# Patient Record
Sex: FEMALE | Race: WHITE | Employment: PART TIME | URBAN - METROPOLITAN AREA
[De-identification: names, ages, dates, MRNs, and addresses within clinical notes are randomized per-mention and may not be internally consistent; named-entity substitution may affect disease eponyms.]

---

## 2022-02-24 ENCOUNTER — HOSPITAL ENCOUNTER (EMERGENCY)
Age: 27
Discharge: HOME | End: 2022-02-24
Attending: EMERGENCY MEDICINE
Payer: COMMERCIAL

## 2022-02-24 VITALS
HEIGHT: 67 IN | DIASTOLIC BLOOD PRESSURE: 73 MMHG | HEART RATE: 70 BPM | RESPIRATION RATE: 16 BRPM | SYSTOLIC BLOOD PRESSURE: 108 MMHG | TEMPERATURE: 97.6 F | WEIGHT: 185 LBS | BODY MASS INDEX: 29.03 KG/M2 | OXYGEN SATURATION: 95 %

## 2022-02-24 DIAGNOSIS — R11.2 NON-INTRACTABLE VOMITING WITH NAUSEA, UNSPECIFIED VOMITING TYPE: ICD-10-CM

## 2022-02-24 DIAGNOSIS — R19.7 DIARRHEA, UNSPECIFIED TYPE: ICD-10-CM

## 2022-02-24 DIAGNOSIS — R55 POSTURAL DIZZINESS WITH PRESYNCOPE: Primary | ICD-10-CM

## 2022-02-24 DIAGNOSIS — R42 POSTURAL DIZZINESS WITH PRESYNCOPE: Primary | ICD-10-CM

## 2022-02-24 LAB
ALBUMIN SERPL-MCNC: 3.2 G/DL (ref 3.5–5)
ALBUMIN/GLOB SERPL: 0.8 {RATIO} (ref 1.1–2.2)
ALP SERPL-CCNC: 91 U/L (ref 45–117)
ALT SERPL-CCNC: 25 U/L (ref 12–78)
ANION GAP SERPL CALC-SCNC: 8 MMOL/L (ref 5–15)
AST SERPL-CCNC: 14 U/L (ref 15–37)
BASOPHILS # BLD: 0 K/UL (ref 0–0.1)
BASOPHILS NFR BLD: 0 % (ref 0–1)
BILIRUB SERPL-MCNC: 0.4 MG/DL (ref 0.2–1)
BUN SERPL-MCNC: 18 MG/DL (ref 6–20)
BUN/CREAT SERPL: 20 (ref 12–20)
CALCIUM SERPL-MCNC: 8.7 MG/DL (ref 8.5–10.1)
CHLORIDE SERPL-SCNC: 104 MMOL/L (ref 97–108)
CO2 SERPL-SCNC: 28 MMOL/L (ref 21–32)
CREAT SERPL-MCNC: 0.9 MG/DL (ref 0.55–1.02)
DIFFERENTIAL METHOD BLD: ABNORMAL
EOSINOPHIL # BLD: 0.1 K/UL (ref 0–0.4)
EOSINOPHIL NFR BLD: 1 % (ref 0–7)
ERYTHROCYTE [DISTWIDTH] IN BLOOD BY AUTOMATED COUNT: 12.8 % (ref 11.5–14.5)
GLOBULIN SER CALC-MCNC: 4.1 G/DL (ref 2–4)
GLUCOSE SERPL-MCNC: 86 MG/DL (ref 65–100)
HCG UR QL: NEGATIVE
HCT VFR BLD AUTO: 46.7 % (ref 35–47)
HGB BLD-MCNC: 15.3 G/DL (ref 11.5–16)
IMM GRANULOCYTES # BLD AUTO: 0.1 K/UL (ref 0–0.04)
IMM GRANULOCYTES NFR BLD AUTO: 1 % (ref 0–0.5)
LYMPHOCYTES # BLD: 0.6 K/UL (ref 0.8–3.5)
LYMPHOCYTES NFR BLD: 8 % (ref 12–49)
MCH RBC QN AUTO: 29.9 PG (ref 26–34)
MCHC RBC AUTO-ENTMCNC: 32.8 G/DL (ref 30–36.5)
MCV RBC AUTO: 91.2 FL (ref 80–99)
MONOCYTES # BLD: 0.5 K/UL (ref 0–1)
MONOCYTES NFR BLD: 6 % (ref 5–13)
NEUTS SEG # BLD: 6.6 K/UL (ref 1.8–8)
NEUTS SEG NFR BLD: 84 % (ref 32–75)
NRBC # BLD: 0 K/UL (ref 0–0.01)
NRBC BLD-RTO: 0 PER 100 WBC
PLATELET # BLD AUTO: 246 K/UL (ref 150–400)
PMV BLD AUTO: 12 FL (ref 8.9–12.9)
POTASSIUM SERPL-SCNC: 4.3 MMOL/L (ref 3.5–5.1)
PROT SERPL-MCNC: 7.3 G/DL (ref 6.4–8.2)
RBC # BLD AUTO: 5.12 M/UL (ref 3.8–5.2)
RBC MORPH BLD: ABNORMAL
SODIUM SERPL-SCNC: 140 MMOL/L (ref 136–145)
WBC # BLD AUTO: 7.9 K/UL (ref 3.6–11)

## 2022-02-24 PROCEDURE — 36415 COLL VENOUS BLD VENIPUNCTURE: CPT

## 2022-02-24 PROCEDURE — 80053 COMPREHEN METABOLIC PANEL: CPT

## 2022-02-24 PROCEDURE — 99283 EMERGENCY DEPT VISIT LOW MDM: CPT

## 2022-02-24 PROCEDURE — 81025 URINE PREGNANCY TEST: CPT

## 2022-02-24 PROCEDURE — 85025 COMPLETE CBC W/AUTO DIFF WBC: CPT

## 2022-02-24 NOTE — ED TRIAGE NOTES
Pt c/o nausea, vomiting and diarrhea x 1 day; went to [facility] this am, 0845 and 0915 near syncope  became sweaty and they [id number] and BP was 107/72 and 90/60; pt given 8mg zofran  Vomited x 1 after zofran; pt denied abdominal pain; pt reports 10-15x/24 hours and diarrhea 4-5x/24 hours; denied urinary SX.  Mucous membranes moist;

## 2022-02-24 NOTE — DISCHARGE INSTRUCTIONS
Thank you for allowing us to provide you with medical care today. We realize that you have many choices for your emergency care needs. We thank you for choosing [facility]. Please choose us in the future for any continued health care needs. We hope we addressed all of your medical concerns. We strive to provide excellent quality care in the Emergency Department. Anything less than excellent does not meet our expectations. The exam and treatment you received in the Emergency Department were for an emergent problem and are not intended as complete care. It is important that you follow up with a doctor, nurse practitioner, or physician's assistant for ongoing care. If your symptoms worsen or you do not improve as expected and you are unable to reach your usual health care provider, you should return to the Emergency Department. We are available 24 hours a day. Take this sheet with you when you go to your follow-up visit. If you have any problem arranging the follow-up visit, contact the Emergency Department immediately. Make an appointment your family doctor for follow up of this visit. Return to the ER if you are unable to be seen in a timely manner.

## 2022-02-24 NOTE — ED PROVIDER NOTES
68-year-old female without any significant medical history presents to the emergency department chief complaint of nausea, vomiting, diarrhea. Symptoms began yesterday morning and are improving. Symptoms began shortly after eating some [employer]. No fever. She went to her primary care doctor and had episode of presyncope was sent to urgent care. She is found to have a blood pressure in the 90s at urgent care and was sent to the emergency department for further management. No sick contacts. She is beginning to feel better after trying to take some Zofran. The history is provided by the patient and medical records. Nausea   This is a new problem. The current episode started yesterday. The problem has been gradually improving. There has been no fever. Associated symptoms include diarrhea. Pertinent negatives include no fever, no abdominal pain, no headaches, no arthralgias, no myalgias, no cough and no headaches. Risk factors include suspect food intake. Her pertinent negatives include no recent abdominal surgery. Vomiting   Associated symptoms include diarrhea. Pertinent negatives include no fever, no abdominal pain, no headaches, no arthralgias, no myalgias, no cough and no headaches. Her pertinent negatives include no recent abdominal surgery. Diarrhea   Associated symptoms include diarrhea, nausea and vomiting. Pertinent negatives include no fever, no dysuria, no headaches, no arthralgias, no myalgias and no chest pain. No past medical history on file. No past surgical history on file. No family history on file.     Social History     Socioeconomic History    Marital status: Not on file     Spouse name: Not on file    Number of children: Not on file    Years of education: Not on file    Highest education level: Not on file   Occupational History    Not on file   Tobacco Use    Smoking status: Not on file    Smokeless tobacco: Not on file   Substance and Sexual Activity    Alcohol use: Not on file    Drug use: Not on file    Sexual activity: Not on file   Other Topics Concern    Not on file   Social History Narrative    Not on file     Social Determinants of Health     Financial Resource Strain:     Difficulty of Paying Living Expenses: Not on file   Food Insecurity:     Worried About Running Out of Food in the Last Year: Not on file    [place] of Food in the Last Year: Not on file   Transportation Needs:     Lack of Transportation (Medical): Not on file    Lack of Transportation (Non-Medical): Not on file   Physical Activity:     Days of Exercise per Week: Not on file    Minutes of Exercise per Session: Not on file   Stress:     Feeling of Stress : Not on file   Social Connections:     Frequency of Communication with Friends and Family: Not on file    Frequency of Social Gatherings with Friends and Family: Not on file    Attends Scientologist Services: Not on file    Active Member of [address] [place] [address] or Organizations: Not on file    Attends Club or Organization Meetings: Not on file    Marital Status: Not on file   Intimate Partner Violence:     Fear of Current or Ex-Partner: Not on file    Emotionally Abused: Not on file    Physically Abused: Not on file    Sexually Abused: Not on file   Housing Stability:     Unable to Pay for Housing in the Last Year: Not on file    Number of [place] in the Last Year: Not on file    Unstable Housing in the Last Year: Not on file         ALLERGIES: Patient has no known allergies. Review of Systems   Constitutional: Negative for fatigue and fever. HENT: Negative for sneezing and sore throat. Respiratory: Negative for cough and shortness of breath. Cardiovascular: Negative for chest pain and leg swelling. Gastrointestinal: Positive for diarrhea, nausea and vomiting. Negative for abdominal pain. Genitourinary: Negative for difficulty urinating and dysuria. Musculoskeletal: Negative for arthralgias and myalgias.    Skin: Negative for color change and rash. Neurological: Negative for weakness and headaches. Psychiatric/Behavioral: Negative for agitation and behavioral problems. Vitals:    02/24/22 1103   BP: 126/70   Pulse: 70   Resp: 16   Temp: 97.6 °F (36.4 °C)   SpO2: 99%   Weight: 83.9 kg (185 lb)   Height: 5' 7\" (1.702 m)            Physical Exam  Vitals and nursing note reviewed. Constitutional:       General: She is not in acute distress. Appearance: Normal appearance. She is well-developed. She is not ill-appearing, toxic-appearing or diaphoretic. HENT:      Head: Normocephalic and atraumatic. Nose: Nose normal.      Mouth/Throat:      Mouth: Mucous membranes are moist.      Pharynx: Oropharynx is clear. Eyes:      Extraocular Movements: Extraocular movements intact. Conjunctiva/sclera: Conjunctivae normal.      Pupils: Pupils are equal, round, and reactive to light. Cardiovascular:      Rate and Rhythm: Normal rate and regular rhythm. Pulses: Normal pulses. Heart sounds: Normal heart sounds. Pulmonary:      Effort: Pulmonary effort is normal. No respiratory distress. Breath sounds: Normal breath sounds. No wheezing. Chest:      Chest wall: No tenderness. Abdominal:      General: Abdomen is flat. There is no distension. Palpations: Abdomen is soft. Tenderness: There is no abdominal tenderness. There is no guarding or rebound. Musculoskeletal:         General: No swelling, tenderness, deformity or signs of injury. Normal range of motion. Cervical back: Normal range of motion and neck supple. No rigidity. No muscular tenderness. Right lower leg: No edema. Left lower leg: No edema. Skin:     General: Skin is warm and dry. Capillary Refill: Capillary refill takes less than 2 seconds. Neurological:      General: No focal deficit present. Mental Status: She is alert and oriented to person, place, and time.    Psychiatric:         Mood and Affect: Mood normal.         Behavior: Behavior normal.          MDM  Number of Diagnoses or Management Options  Diagnosis management comments: 42-year-old female presents as above with about 24 hours of nausea, vomiting with presyncope and reported hypotension at the urgent care. She has been hemodynamically normal in the emergency department with reassuring labs. Plan to discharge with instructions to follow-up with primary care, return the.  I suspect likely viral versus bad food. Encourage p.o. fluid intake.        Amount and/or Complexity of Data Reviewed  Clinical lab tests: reviewed           Procedures

## 2023-03-21 ENCOUNTER — VIRTUAL VISIT (OUTPATIENT)
Dept: PRIMARY CARE CLINIC | Age: 28
End: 2023-03-21
Payer: COMMERCIAL

## 2023-03-21 DIAGNOSIS — E66.09 OBESITY DUE TO EXCESS CALORIES WITHOUT SERIOUS COMORBIDITY, UNSPECIFIED CLASSIFICATION: ICD-10-CM

## 2023-03-21 DIAGNOSIS — F41.9 ANXIETY: ICD-10-CM

## 2023-03-21 DIAGNOSIS — Z76.89 ENCOUNTER TO ESTABLISH CARE: Primary | ICD-10-CM

## 2023-03-21 DIAGNOSIS — J45.20 MILD INTERMITTENT ASTHMA WITHOUT COMPLICATION: ICD-10-CM

## 2023-03-21 PROCEDURE — 99204 OFFICE O/P NEW MOD 45 MIN: CPT | Performed by: NURSE PRACTITIONER

## 2023-03-21 RX ORDER — ESCITALOPRAM OXALATE 10 MG/1
10 TABLET ORAL DAILY
Qty: 90 TABLET | Refills: 1 | Status: SHIPPED | OUTPATIENT
Start: 2023-03-21

## 2023-03-21 RX ORDER — PHENTERMINE HYDROCHLORIDE 30 MG/1
30 CAPSULE ORAL
Qty: 30 CAPSULE | Refills: 0 | Status: SHIPPED | OUTPATIENT
Start: 2023-03-21

## 2023-03-21 RX ORDER — ESCITALOPRAM OXALATE 10 MG/1
10 TABLET ORAL DAILY
Qty: 90 TABLET | Refills: 1 | Status: SHIPPED | OUTPATIENT
Start: 2023-03-21 | End: 2023-03-21

## 2023-03-21 RX ORDER — DESOGESTREL AND ETHINYL ESTRADIOL 0.15-0.03
KIT ORAL
COMMUNITY

## 2023-03-21 RX ORDER — ESCITALOPRAM OXALATE 10 MG/1
10 TABLET ORAL DAILY
COMMUNITY
End: 2023-03-21 | Stop reason: SDUPTHER

## 2023-03-21 RX ORDER — PHENTERMINE HYDROCHLORIDE 30 MG/1
30 CAPSULE ORAL
Qty: 30 CAPSULE | Refills: 0 | Status: SHIPPED | OUTPATIENT
Start: 2023-03-21 | End: 2023-03-21 | Stop reason: SDUPTHER

## 2023-03-21 RX ORDER — ESCITALOPRAM OXALATE 10 MG/1
10 TABLET ORAL DAILY
Qty: 90 TABLET | Refills: 1 | Status: SHIPPED | OUTPATIENT
Start: 2023-03-21 | End: 2023-03-21 | Stop reason: SDUPTHER

## 2023-03-21 NOTE — PROGRESS NOTES
HISTORY OF PRESENT ILLNESS  Jason Cooper is a 24 y.o. female presents via telemedicine to establish care. Previous PCP: Dr. Lacie Niño  PMH: asthma, YOUNG    GYN- Dr Maia Billings at [facility]. UTD with pap smears     Concerns:  Struggles with her weight. Previously was on mediweightloss and did well but stopped going. Feels like she is focusing on protein at all meals and is not losing weight. Struggles with cravings. 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 Exercise- gym 3 times a week; mix of cardio and strength training. 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 There is no problem list on file for this patient. There are no problems to display for this patient. Current Outpatient Medications   Medication Sig Dispense Refill    desogestreL-ethinyl estradioL (DESOGEN) 0.15-0.03 mg tab Isibloom 0.15 mg-0.03 mg tablet   TAKE 1 TABLET BY MOUTH DAILY      escitalopram oxalate (Lexapro) 10 mg tablet Take 1 Tablet by mouth daily. 90 Tablet 1    phentermine 30 mg capsule Take 1 Capsule by mouth every morning. Max Daily Amount: 30 mg. 30 Capsule 0     No Known Allergies  Past Medical History:   Diagnosis Date    Asthma     Psychiatric disorder     anxiety     History reviewed. No pertinent surgical history. History reviewed. No pertinent family history. Social History     Tobacco Use    Smoking status: Never    Smokeless tobacco: Never   Substance Use Topics    Alcohol use: Yes     Comment: social           Review of Systems   Constitutional: Negative. Respiratory: Negative. Cardiovascular: Negative. Genitourinary: Negative. Musculoskeletal: Negative. Neurological: Negative. Psychiatric/Behavioral: Negative. Physical Exam  Constitutional:       General: She is not in acute distress. Appearance: She is obese. HENT:      Head: Normocephalic.    Pulmonary:      Effort: Pulmonary effort is normal.   Neurological:      Mental Status: She is alert and oriented to person, place, and time. Psychiatric:         Mood and Affect: Mood normal.         Behavior: Behavior normal.     Last PDMP Dionisio as Reviewed:  Review User Review Instant Review Result   FINN DENNISON 3/21/2023 11:24 AM Reviewed PDMP [1]         ASSESSMENT and PLAN  Diagnoses and all orders for this visit:    1. Encounter to establish care    2. Anxiety  Comments:  Stable on lexapro. Refill sent. Orders:  -     escitalopram oxalate (Lexapro) 10 mg tablet; Take 1 Tablet by mouth daily. 3. Mild intermittent asthma without complication  Comments:  Stable. Uses rescue inhaler very infrequently. 4. Obesity due to excess calories without serious comorbidity, unspecified classification  Comments:  Discussed keeping food journal of intake over the next few weeks. will follow up in one month to review. She was on phentermine before and that worked for cravings. Will restart this for one month to help her get back on track. Orders:  -     phentermine 30 mg capsule; Take 1 Capsule by mouth every morning. Max Daily Amount: 30 mg. Tuyet Cervantes, who was evaluated through a synchronous (real-time) audio-video encounter, and/or her healthcare decision maker, is aware that it is a billable service, with coverage as determined by her insurance carrier. She provided verbal consent to proceed: Yes, and patient identification was verified. It was conducted pursuant to the emergency declaration under the [address] [place] [address], [address] [place] [address] authority and the [employer] and [employer] Act. A caregiver was present when appropriate. Ability to conduct physical exam was limited. I was at home. The patient was at home. Tuyet Cervantes is a 24 y.o. female being evaluated by a Virtual Visit (video visit) encounter to address concerns as mentioned above.   A caregiver was present when appropriate. Due to this being a TeleHealth encounter (During ZTVIF-19 public health emergency), evaluation of the following organ systems was limited: Vitals/Constitutional/EENT/Resp/CV/GI//MS/Neuro/Skin/Heme-Lymph-Imm. Pursuant to the emergency declaration under the [address] [place] [address], [address] [place] [address] and the [employer] and [employer] Act, this Virtual Visit was conducted with patient's (and/or legal guardian's) consent, to reduce the risk of exposure to COVID-19 and provide necessary medical care. Services were provided through a video synchronous discussion virtually to substitute for in-person encounter. --MATTHEW Blake on 3/21/2023 at 10:59 AM    An electronic signature was used to authenticate this note.

## 2023-03-21 NOTE — PROGRESS NOTES
There were no vitals taken for this visit. Chief Complaint   Patient presents with    Establish Care       1. \"Have you been to the ER, urgent care clinic since your last visit? Hospitalized since your last visit? \" No    2. \"Have you seen or consulted any other health care providers outside of the [address] [place] [address] since your last visit? \" No     3. For patients aged 39-70: Has the patient had a colonoscopy / FIT/ Cologuard? NA - based on age      If the patient is female:    4. For patients aged 41-77: Has the patient had a mammogram within the past 2 years? NA - based on age or sex      11. For patients aged 21-65: Has the patient had a pap smear?  Yes - no Care Gap present

## 2023-04-26 ENCOUNTER — VIRTUAL VISIT (OUTPATIENT)
Dept: PRIMARY CARE CLINIC | Age: 28
End: 2023-04-26
Payer: COMMERCIAL

## 2023-04-26 DIAGNOSIS — E66.09 OBESITY DUE TO EXCESS CALORIES WITHOUT SERIOUS COMORBIDITY, UNSPECIFIED CLASSIFICATION: Primary | ICD-10-CM

## 2023-04-26 PROCEDURE — 99213 OFFICE O/P EST LOW 20 MIN: CPT | Performed by: NURSE PRACTITIONER

## 2023-04-26 RX ORDER — PHENTERMINE HYDROCHLORIDE 30 MG/1
30 CAPSULE ORAL
Qty: 30 CAPSULE | Refills: 0 | Status: SHIPPED | OUTPATIENT
Start: 2023-04-26

## 2023-04-26 NOTE — PROGRESS NOTES
Identified Patient with two Patient identifiers(name and ). 1. \"Have you been to the ER, urgent care clinic since your last visit? Hospitalized since your last visit? \" No    2. \"Have you seen or consulted any other health care providers outside of the [address] [place] [address] since your last visit? \" No     3. For patients aged 39-70: Has the patient had a colonoscopy / FIT/ Cologuard? NA - based on age      If the patient is female:    4. For patients aged 41-77: Has the patient had a mammogram within the past 2 years? NA - based on age or sex      11. For patients aged 21-65: Has the patient had a pap smear? NA - based on age or sex     There were no vitals taken for this visit.     Chief Complaint   Patient presents with    Follow-up       Health Maintenance Due   Topic Date Due    Hepatitis C Screening  Never done    COVID-19 Vaccine (1) Never done    Pap Smear  Never done

## 2023-04-26 NOTE — PROGRESS NOTES
HISTORY OF PRESENT ILLNESS  Ross Xavier is a 24 y.o. female presents via telemedicine for medication refill. Patient states she has lost three pounds in the last month with the help of phentermine. She  is following a high protein low carb diet. She has been exercising as well. She denies any side effects to the medication - denies palpitations, chest pain, sleep trouble. 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 There is no problem list on file for this patient. There are no problems to display for this patient. Current Outpatient Medications   Medication Sig Dispense Refill    phentermine 30 mg capsule Take 1 Capsule by mouth every morning. Max Daily Amount: 30 mg. 30 Capsule 0    desogestreL-ethinyl estradioL (DESOGEN) 0.15-0.03 mg tab Isibloom 0.15 mg-0.03 mg tablet   TAKE 1 TABLET BY MOUTH DAILY      escitalopram oxalate (Lexapro) 10 mg tablet Take 1 Tablet by mouth daily. 90 Tablet 1     No Known Allergies  Past Medical History:   Diagnosis Date    Asthma     Psychiatric disorder     anxiety     History reviewed. No pertinent surgical history. History reviewed. No pertinent family history. Social History     Tobacco Use    Smoking status: Never    Smokeless tobacco: Never   Substance Use Topics    Alcohol use: Yes     Comment: social           Review of Systems   Constitutional: Negative. HENT: Negative. Eyes: Negative. Respiratory: Negative. Cardiovascular: Negative. Gastrointestinal: Negative. Genitourinary: Negative. Musculoskeletal: Negative. Psychiatric/Behavioral: Negative. Physical Exam  Constitutional:       General: She is not in acute distress. Appearance: She is obese. Pulmonary:      Effort: Pulmonary effort is normal.   Neurological:      Mental Status: She is alert and oriented to person, place, and time.    Psychiatric:         Mood and Affect: Mood normal. Behavior: Behavior normal.         ASSESSMENT and PLAN  Diagnoses and all orders for this visit:    1. Obesity due to excess calories without serious comorbidity, unspecified classification  Comments:  continue phentermine, low calorie diet and exercise. Next visit in officce for vital signs and EKG. Orders:  -     phentermine 30 mg capsule; Take 1 Capsule by mouth every morning. Max Daily Amount: 30 mg. Last PDMP Marta Carrillo as Reviewed:  Review User Review Instant Review Result   Andre Gloria 4/26/2023  8:00 AM Reviewed PDMP [1]         Maribell Vizcarra, who was evaluated through a synchronous (real-time) audio-video encounter, and/or her healthcare decision maker, is aware that it is a billable service, with coverage as determined by her insurance carrier. She provided verbal consent to proceed: Yes, and patient identification was verified. It was conducted pursuant to the emergency declaration under the [address] [place] [address] and the [employer] Act. A caregiver was present when appropriate. Ability to conduct physical exam was limited. I was at home. The patient was at home. Maribell Vizcarra is a 24 y.o. female being evaluated by a Virtual Visit (video visit) encounter to address concerns as mentioned above. A caregiver was present when appropriate. Due to this being a TeleHealth encounter (During [facility]- public health emergency), evaluation of the following organ systems was limited: Vitals/Constitutional/EENT/Resp/CV/GI//MS/Neuro/Skin/Heme-Lymph-Imm.   Pursuant to the emergency declaration under the [address] [place] [address] and the [employer] Act, this Virtual Visit was conducted with patient's (and/or legal guardian's) consent, to reduce the risk of exposure to COVID-19 and provide necessary medical care. Services were provided through a video synchronous discussion virtually to substitute for in-person encounter. --MATTHEW Cano on 4/26/2023 at 7:45 AM    An electronic signature was used to authenticate this note.

## 2023-06-09 ENCOUNTER — TELEMEDICINE (OUTPATIENT)
Dept: PRIMARY CARE CLINIC | Facility: CLINIC | Age: 28
End: 2023-06-09
Payer: COMMERCIAL

## 2023-06-09 DIAGNOSIS — E66.09 OTHER OBESITY DUE TO EXCESS CALORIES: Primary | ICD-10-CM

## 2023-06-09 PROCEDURE — 99213 OFFICE O/P EST LOW 20 MIN: CPT | Performed by: NURSE PRACTITIONER

## 2023-06-09 RX ORDER — PHENTERMINE HYDROCHLORIDE 30 MG/1
30 CAPSULE ORAL EVERY MORNING
Qty: 30 CAPSULE | Refills: 0 | Status: SHIPPED | OUTPATIENT
Start: 2023-06-09 | End: 2023-07-09

## 2023-06-09 RX ORDER — ESCITALOPRAM OXALATE 10 MG/1
10 TABLET ORAL DAILY
COMMUNITY

## 2023-06-09 SDOH — ECONOMIC STABILITY: FOOD INSECURITY: WITHIN THE PAST 12 MONTHS, YOU WORRIED THAT YOUR FOOD WOULD RUN OUT BEFORE YOU GOT MONEY TO BUY MORE.: NEVER TRUE

## 2023-06-09 SDOH — ECONOMIC STABILITY: FOOD INSECURITY: WITHIN THE PAST 12 MONTHS, THE FOOD YOU BOUGHT JUST DIDN'T LAST AND YOU DIDN'T HAVE MONEY TO GET MORE.: NEVER TRUE

## 2023-06-09 SDOH — ECONOMIC STABILITY: INCOME INSECURITY: HOW HARD IS IT FOR YOU TO PAY FOR THE VERY BASICS LIKE FOOD, HOUSING, MEDICAL CARE, AND HEATING?: NOT HARD AT ALL

## 2023-06-09 SDOH — ECONOMIC STABILITY: HOUSING INSECURITY
IN THE LAST 12 MONTHS, WAS THERE A TIME WHEN YOU DID NOT HAVE A STEADY PLACE TO SLEEP OR SLEPT IN A SHELTER (INCLUDING NOW)?: NO

## 2023-06-09 ASSESSMENT — ENCOUNTER SYMPTOMS
GASTROINTESTINAL NEGATIVE: 1
RESPIRATORY NEGATIVE: 1

## 2023-06-09 ASSESSMENT — PATIENT HEALTH QUESTIONNAIRE - PHQ9
2. FEELING DOWN, DEPRESSED OR HOPELESS: 0
SUM OF ALL RESPONSES TO PHQ QUESTIONS 1-9: 0
1. LITTLE INTEREST OR PLEASURE IN DOING THINGS: 0
SUM OF ALL RESPONSES TO PHQ9 QUESTIONS 1 & 2: 0

## 2023-06-09 NOTE — PROGRESS NOTES
Identified Patient with two Patient identifiers(name and ). 1. Have you been to the ER, urgent care clinic since your last visit? Hospitalized since your last visit? No    2. Have you seen or consulted any other health care providers outside of the [address] [place] [address] since your last visit? No     3. For patients aged 39-70: Has the patient had a colonoscopy / FIT/ Cologuard? No    If the patient is female:    4. For patients aged 41-77: Has the patient had a mammogram within the past 2 years? No      5. For patients aged 21-65: Has the patient had a pap smear? No   There were no vitals taken for this visit. Chief Complaint   Patient presents with    Medication Refill       Health Maintenance Due   Topic Date Due    COVID-19 Vaccine (1) Never done    Varicella vaccine (1 of 2 - 2-dose childhood series) Never done    HPV vaccine (1 - 2-dose series) Never done    HIV screen  Never done    Hepatitis C screen  Never done    DTaP/Tdap/Td vaccine (1 - Tdap) Never done    Pap smear  Never done          No questionnaires available.
(and/or legal guardian's) consent, to reduce the risk of exposure to COVID-19 and provide necessary medical care. Services were provided through a video synchronous discussion virtually to substitute for in-person encounter. --TOSHA Lenz CNP on 6/9/2023 at 9:33 AM    An electronic signature was used to authenticate this note.

## 2023-07-10 ENCOUNTER — TELEPHONE (OUTPATIENT)
Dept: PRIMARY CARE CLINIC | Facility: CLINIC | Age: 28
End: 2023-07-10

## 2023-08-10 ENCOUNTER — OFFICE VISIT (OUTPATIENT)
Dept: PRIMARY CARE CLINIC | Facility: CLINIC | Age: 28
End: 2023-08-10
Payer: COMMERCIAL

## 2023-08-10 VITALS
DIASTOLIC BLOOD PRESSURE: 74 MMHG | HEIGHT: 67 IN | RESPIRATION RATE: 18 BRPM | BODY MASS INDEX: 30.42 KG/M2 | WEIGHT: 193.8 LBS | TEMPERATURE: 97.9 F | HEART RATE: 86 BPM | SYSTOLIC BLOOD PRESSURE: 120 MMHG | OXYGEN SATURATION: 98 %

## 2023-08-10 DIAGNOSIS — E66.09 OTHER OBESITY DUE TO EXCESS CALORIES: ICD-10-CM

## 2023-08-10 DIAGNOSIS — F41.9 ANXIETY DISORDER, UNSPECIFIED TYPE: Primary | ICD-10-CM

## 2023-08-10 PROCEDURE — 99214 OFFICE O/P EST MOD 30 MIN: CPT | Performed by: NURSE PRACTITIONER

## 2023-08-10 RX ORDER — ESCITALOPRAM OXALATE 10 MG/1
10 TABLET ORAL DAILY
Qty: 90 TABLET | Refills: 1 | Status: SHIPPED | OUTPATIENT
Start: 2023-08-10

## 2023-08-10 RX ORDER — PHENTERMINE HYDROCHLORIDE 30 MG/1
30 CAPSULE ORAL EVERY MORNING
Qty: 30 CAPSULE | Refills: 0 | Status: SHIPPED | OUTPATIENT
Start: 2023-08-10 | End: 2023-09-09

## 2023-08-10 RX ORDER — DESOGESTREL AND ETHINYL ESTRADIOL 0.15-0.03
KIT ORAL
COMMUNITY

## 2023-08-10 ASSESSMENT — ENCOUNTER SYMPTOMS
GASTROINTESTINAL NEGATIVE: 1
RESPIRATORY NEGATIVE: 1

## 2023-08-10 NOTE — PROGRESS NOTES
Identified Patient with two Patient identifiers(name and ). 1. Have you been to the ER, urgent care clinic since your last visit? Hospitalized since your last visit? No    2. Have you seen or consulted any other health care providers outside of the [address] [place] [address] since your last visit? No     3. For patients aged 43-73: Has the patient had a colonoscopy / FIT/ Cologuard? NA - based on age/sex    If the patient is female:    4. For patients aged 43-66: Has the patient had a mammogram within the past 2 years? NA - based on age/sex      5. For patients aged 21-65: Has the patient had a pap smear? Yes-No Care Gap Present   There were no vitals taken for this visit. Chief Complaint   Patient presents with    Medication Refill       Health Maintenance Due   Topic Date Due    HIV screen  Never done    Hepatitis C screen  Never done    Pap smear  Never done    COVID-19 Vaccine (4 - Booster for Moderna series) 2022    Flu vaccine (1) 2023          No questionnaires available.

## 2023-09-08 ENCOUNTER — TELEMEDICINE (OUTPATIENT)
Dept: PRIMARY CARE CLINIC | Facility: CLINIC | Age: 28
End: 2023-09-08
Payer: COMMERCIAL

## 2023-09-08 DIAGNOSIS — E66.09 OTHER OBESITY DUE TO EXCESS CALORIES: Primary | ICD-10-CM

## 2023-09-08 PROCEDURE — 99213 OFFICE O/P EST LOW 20 MIN: CPT | Performed by: NURSE PRACTITIONER

## 2023-09-08 RX ORDER — PHENTERMINE HYDROCHLORIDE 30 MG/1
30 CAPSULE ORAL EVERY MORNING
Qty: 30 CAPSULE | Refills: 0 | Status: SHIPPED | OUTPATIENT
Start: 2023-09-08 | End: 2023-10-08

## 2023-09-08 ASSESSMENT — ENCOUNTER SYMPTOMS
GASTROINTESTINAL NEGATIVE: 1
RESPIRATORY NEGATIVE: 1

## 2023-09-08 NOTE — PROGRESS NOTES
Identified Patient with two Patient identifiers(name and ). 1. Have you been to the ER, urgent care clinic since your last visit? Hospitalized since your last visit? No    2. Have you seen or consulted any other health care providers outside of the [address] [place] [address] since your last visit? No     3. For patients aged 43-73: Has the patient had a colonoscopy / FIT/ Cologuard? NA - based on age/sex    If the patient is female:    4. For patients aged 43-66: Has the patient had a mammogram within the past 2 years? NA - based on age/sex      5. For patients aged 21-65: Has the patient had a pap smear? LMP 2023 (Exact Date)     Chief Complaint   Patient presents with    Follow-up     medication       Health Maintenance Due   Topic Date Due    HIV screen  Never done    Hepatitis C screen  Never done    Pap smear  Never done    COVID-19 Vaccine (4 - Booster for Moderna series) 2022    Flu vaccine (1) 2023          No questionnaires available.
person, place, and time. Mental status is at baseline. Psychiatric:         Mood and Affect: Mood normal.         Behavior: Behavior normal.         Tomeka Hernandez was evaluated through a patient-initiated, synchronous (real-time) audio only encounter. She (or guardian if applicable) is aware that it is a billable service, which includes applicable co-pays, with coverage as determined by her insurance carrier. This visit was conducted with the patient's (and/or Jaswinder Muñoz guardian's) verbal consent. She has not had a related appointment within my department in the past 7 days or scheduled within the next 24 hours. The patient was located in a state where the provider was licensed to provide care.   The patient was located at: Home: [address] [place] [address] [address]  The provider was located at: Home ([address] [facility]): [address] [facility]

## 2024-02-09 ENCOUNTER — TELEMEDICINE (OUTPATIENT)
Dept: PRIMARY CARE CLINIC | Facility: CLINIC | Age: 29
End: 2024-02-09

## 2024-02-09 DIAGNOSIS — E66.09 OTHER OBESITY DUE TO EXCESS CALORIES: Primary | ICD-10-CM

## 2024-02-09 PROCEDURE — 99213 OFFICE O/P EST LOW 20 MIN: CPT | Performed by: NURSE PRACTITIONER

## 2024-02-09 RX ORDER — PHENTERMINE HYDROCHLORIDE 30 MG/1
30 CAPSULE ORAL EVERY MORNING
Qty: 30 CAPSULE | Refills: 0 | Status: SHIPPED | OUTPATIENT
Start: 2024-02-09 | End: 2024-03-10

## 2024-02-09 SDOH — ECONOMIC STABILITY: FOOD INSECURITY: WITHIN THE PAST 12 MONTHS, YOU WORRIED THAT YOUR FOOD WOULD RUN OUT BEFORE YOU GOT MONEY TO BUY MORE.: NEVER TRUE

## 2024-02-09 SDOH — ECONOMIC STABILITY: INCOME INSECURITY: HOW HARD IS IT FOR YOU TO PAY FOR THE VERY BASICS LIKE FOOD, HOUSING, MEDICAL CARE, AND HEATING?: NOT HARD AT ALL

## 2024-02-09 SDOH — ECONOMIC STABILITY: FOOD INSECURITY: WITHIN THE PAST 12 MONTHS, THE FOOD YOU BOUGHT JUST DIDN'T LAST AND YOU DIDN'T HAVE MONEY TO GET MORE.: NEVER TRUE

## 2024-02-09 ASSESSMENT — PATIENT HEALTH QUESTIONNAIRE - PHQ9
1. LITTLE INTEREST OR PLEASURE IN DOING THINGS: 0
SUM OF ALL RESPONSES TO PHQ QUESTIONS 1-9: 0
2. FEELING DOWN, DEPRESSED OR HOPELESS: 0
SUM OF ALL RESPONSES TO PHQ9 QUESTIONS 1 & 2: 0
SUM OF ALL RESPONSES TO PHQ QUESTIONS 1-9: 0

## 2024-02-09 NOTE — PROGRESS NOTES
Identified Patient with two Patient identifiers(name and ).     1. Have you been to the ER, urgent care clinic since your last visit?  Hospitalized since your last visit?No    2. Have you seen or consulted any other health care providers outside of the [facility] since your last visit?   No     3. For patients aged 45-75: Has the patient had a colonoscopy / FIT/ Cologuard? No    If the patient is female:    4. For patients aged 40-74: Has the patient had a mammogram within the past 2 years?  NA - based on age/sex      5. For patients aged 21-65: Has the patient had a pap smear?  No      There were no vitals taken for this visit.    Chief Complaint   Patient presents with    Other     Pt needs med refills and no acute issues       Health Maintenance Due   Topic Date Due    HIV screen  Never done    Hepatitis C screen  Never done    Pap smear  Never done    Flu vaccine (1) 2023    COVID-19 Vaccine ( season) 2023          No questionnaires available.

## 2024-02-09 NOTE — PROGRESS NOTES
Sherrell Mcadams is a 28 y.o. female who was seen via telemedicine today 2/9/2024).    Assessment & Plan:   Below is the assessment and plan developed based on review of pertinent history, physical exam, labs, studies, and medications.    1. Other obesity due to excess calories  Comments:  Continue with diet and exercise changes as well.   will only be using 2-3 months  Orders:  -     phentermine 30 MG capsule; Take 1 capsule by mouth every morning for 30 days. Max Daily Amount: 30 mg, Disp-30 capsule, R-0Normal        PDMP Monitoring:    Last PDMP [place] as Reviewed:  Review User Review Instant Review Result   MICHAELLE OSORIO 2/9/2024  8:17 AM Reviewed PDMP [1]       No follow-ups on file.    Subjective:   Sherrell was seen today for Other (Pt needs med refills and no acute issues)     Obesity: she has lost another 5-7 pounds since being off the phentermine. She is getting  in May and would like to get back on phentermine to help jump start weight loss. She is still eating healthy and going to the gym 3 days a week. She checks BP at work.     She stopped lexapro in Oct/Nov and is doing well off it.     Review of Systems   Constitutional:  Negative for fatigue.   Cardiovascular:  Negative for chest pain and palpitations.   Psychiatric/Behavioral:  Negative for sleep disturbance. The patient is not nervous/anxious.           Objective:     There were no vitals filed for this visit.   There is no height or weight on file to calculate BMI.         2/9/2024     8:15 AM   Patient-Reported Vitals   Patient-Reported Weight 184   Patient-Reported Systolic 118 mmHg   Patient-Reported Diastolic 72 mmHg          Physical Exam  Constitutional:       General: She is not in acute distress.     Appearance: Normal appearance.   HENT:      Head: Normocephalic.   Pulmonary:      Effort: Pulmonary effort is normal.   Neurological:      Mental Status: She is alert and oriented to person, place, and time. Mental status is at